# Patient Record
Sex: FEMALE
[De-identification: names, ages, dates, MRNs, and addresses within clinical notes are randomized per-mention and may not be internally consistent; named-entity substitution may affect disease eponyms.]

---

## 2020-04-15 ENCOUNTER — APPOINTMENT (OUTPATIENT)
Dept: ORTHOPEDIC SURGERY | Facility: CLINIC | Age: 30
End: 2020-04-15
Payer: COMMERCIAL

## 2020-04-15 VITALS
HEIGHT: 68 IN | SYSTOLIC BLOOD PRESSURE: 131 MMHG | DIASTOLIC BLOOD PRESSURE: 87 MMHG | WEIGHT: 157 LBS | BODY MASS INDEX: 23.79 KG/M2 | TEMPERATURE: 98.2 F | HEART RATE: 89 BPM

## 2020-04-15 DIAGNOSIS — Z78.9 OTHER SPECIFIED HEALTH STATUS: ICD-10-CM

## 2020-04-15 DIAGNOSIS — Z80.42 FAMILY HISTORY OF MALIGNANT NEOPLASM OF PROSTATE: ICD-10-CM

## 2020-04-15 DIAGNOSIS — Z82.61 FAMILY HISTORY OF ARTHRITIS: ICD-10-CM

## 2020-04-15 PROCEDURE — 73630 X-RAY EXAM OF FOOT: CPT | Mod: LT

## 2020-04-15 PROCEDURE — L3260: CPT

## 2020-04-15 PROCEDURE — 99204 OFFICE O/P NEW MOD 45 MIN: CPT

## 2020-04-15 NOTE — PHYSICAL EXAM
[de-identified] : Constitutional: Well-nourished, well-developed, No acute distress\par Respiratory:  Good respiratory effort, no SOB\par Lymphatic: No regional lymphadenopathy, no lymphedema\par Psychiatric: Pleasant and normal affect, alert and oriented x3\par Skin: Clean dry and intact B/L UE/LE\par Musculoskeletal: normal except where as noted in regional exam\par left Foot:\par APPEARANCE: + swelling, ecchymosis 4th prox phalanx, no malalignment\par POSITIVE TENDERNESS: 4th proximal phalanx, metatarsal\par NONTENDER: 5th metatarsal base, cuboid, 1st MTP, dorsum & plantar surfaces, medial heel, mid heel. \par ROM: normal throughout foot, ankle, and digits. \par RESISTIVE TESTING: painless flex/ext, abd/add of all digits. \par SPECIAL TESTS:  neg Tinel's at tarsal tunnel. \par NEURO: Normal sensation of LE, DTRs 2+/4 patella and achilles\par PULSES: 2+ DP/PT pulses\par \par B/L Knees: No asymmetry, malalignment, or swelling, Full ROM, 5/5 strength in Flex/Ext, Joints stable\par B/L Ankles: No asymmetry, malalignment, or swelling, Full ROM, 5/5 strength in DF/PF/Inv/Ev, Joints stable\par \par \par  [de-identified] : \par The following radiographs were ordered and read by me during this patient's visit. I reviewed each radiograph in detail with the patient and discussed the findings as highlighted below. \par \par 3 views of the left foot were obtained today that show a minimally displaced, partially comminuted oblique fracture at the proximal 4th phalanx

## 2020-04-15 NOTE — DISCUSSION/SUMMARY
[de-identified] : \par Pastora presents with a fourth proximal phalanx fracture.  There is minimal displacement evident on x-ray.  I have advised her to continue buddy taping the toes together.  I have provided her with a postop shoe as well.  Continue ice, elevation, and NSAIDs as needed.  Follow-up in 4 weeks for repeat imaging.\par \par WE SPECIFICALLY DISCUSSED RISK FACTORS FOR COVID-19, INCLUDING AGE>60, HEART OR LUNG DISEASE, DIABETES, IMMUNOSUPPRESSION, AND TRAVEL.  WE ALSO DISCUSSED THAT NSAIDS MAY WORSEN COVID-19 INFECTION SYMPTOMS AND THAT THEY SHOULD NOT BE USED TO TREAT COVID-19 SYMPTOMS.  PATIENT WAS ALSO INFORMED THAT CORTICOSTEROIDS IN ANY FORM (ORAL OR INJECTABLE) WILL DECREASE IMMUNE RESPONSE AND MAY INCREASE RISK OF COVID-19 INFECTION AND SYMPTOMS.\par \par Tabatha Clements MD, EdM\par Sports Medicine PM&R\par \par \par Rosemarie GALVEZ ATC, assisted with the history and documentation for Dr. Clements on this date 04/15/2020\par \[facility]

## 2020-04-15 NOTE — HISTORY OF PRESENT ILLNESS
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  Pain is primarily located at the proximal left 4th toe,   It began in 4/13/2020], after stubbing her toe into a couch. Toe was angulated laterally, she was able to self reduce and tape it to the 3rd toe.   Pain is described as sharp in nature, 3/10 in intensity, worse with weight baring, better with non-weight baring .  \par mild bruising and swelling over the 4th toe and left foot.\par SHe was seen by her primary care doctor and was referred.\par No Prior injury\par Denies bowel/bladder changes, fevers, chills, saddle anesthesia.  Denies numbness, tingling, weakness of the lower extremities.   \par

## 2020-04-15 NOTE — REASON FOR VISIT
[Initial Visit] : an initial visit for [Other: ____] : [unfilled] [Spouse] : spouse [Family Member] : family member

## 2020-05-15 ENCOUNTER — APPOINTMENT (OUTPATIENT)
Dept: ORTHOPEDIC SURGERY | Facility: CLINIC | Age: 30
End: 2020-05-15
Payer: COMMERCIAL

## 2020-05-15 PROCEDURE — 99214 OFFICE O/P EST MOD 30 MIN: CPT

## 2020-05-15 PROCEDURE — 73630 X-RAY EXAM OF FOOT: CPT | Mod: LT

## 2020-05-15 NOTE — ADDENDUM
[FreeTextEntry1] : I, Nancy Zuluaga ATC, assisted with the history and documentation for Dr. Clements on this date 05/15/2020.\par

## 2020-05-15 NOTE — PHYSICAL EXAM
[de-identified] : Constitutional: Well-nourished, well-developed, No acute distress\par Respiratory:  Good respiratory effort, no SOB\par Lymphatic: No regional lymphadenopathy, no lymphedema\par Psychiatric: Pleasant and normal affect, alert and oriented x3\par Skin: Clean dry and intact B/L UE/LE\par Musculoskeletal: normal except where as noted in regional exam\par left Foot:\par APPEARANCE: + swelling, ecchymosis 4th prox phalanx, no malalignment\par POSITIVE TENDERNESS: 4th proximal phalanx, metatarsal\par NONTENDER: 5th metatarsal base, cuboid, 1st MTP, dorsum & plantar surfaces, medial heel, mid heel. \par ROM: normal throughout foot, ankle, and digits. \par RESISTIVE TESTING: painless flex/ext, abd/add of all digits. \par SPECIAL TESTS:  neg Tinel's at tarsal tunnel. \par NEURO: Normal sensation of LE, DTRs 2+/4 patella and achilles\par PULSES: 2+ DP/PT pulses\par \par B/L Knees: No asymmetry, malalignment, or swelling, Full ROM, 5/5 strength in Flex/Ext, Joints stable\par B/L Ankles: No asymmetry, malalignment, or swelling, Full ROM, 5/5 strength in DF/PF/Inv/Ev, Joints stable\par \par \par  [de-identified] : \par The following radiographs were ordered and read by me during this patient's visit. I reviewed each radiograph in detail with the patient and discussed the findings as highlighted below. \par \par 3 views of the left foot were obtained today that show a minimally displaced,  oblique fracture at the proximal 4th phalanx

## 2020-05-15 NOTE — DISCUSSION/SUMMARY
[de-identified] : \par Pastora presents with a fourth proximal phalanx fracture, she is doing well  I have advised her to continue roxana taping the toes together.  She may start wearing supportive shoes if comfortable   Follow-up in 4 weeks for repeat imaging.\par \par Tabatha Clements MD, EdM\par Sports Medicine PM&R\par \par \par \par

## 2020-05-15 NOTE — HISTORY OF PRESENT ILLNESS
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  Pain is primarily located at the proximal left 4th toe,   It began in 4/13/2020, after stubbing her toe into a couch. She was last seen on 4/15 at which time xrays demonstrated a mildly displaced 4th proximal phalanx fracture.  Since that time she has been has been roxana taping the toe and using a post-op shoe. \par

## 2020-05-15 NOTE — REASON FOR VISIT
[Spouse] : spouse [Family Member] : family member [Follow-Up Visit] : a follow-up visit for [Other: ____] : [unfilled]

## 2020-06-15 ENCOUNTER — APPOINTMENT (OUTPATIENT)
Dept: ORTHOPEDIC SURGERY | Facility: CLINIC | Age: 30
End: 2020-06-15
Payer: COMMERCIAL

## 2020-06-15 VITALS
SYSTOLIC BLOOD PRESSURE: 120 MMHG | HEIGHT: 62 IN | DIASTOLIC BLOOD PRESSURE: 72 MMHG | TEMPERATURE: 98.2 F | HEART RATE: 81 BPM | OXYGEN SATURATION: 98 % | WEIGHT: 152 LBS | BODY MASS INDEX: 27.97 KG/M2

## 2020-06-15 DIAGNOSIS — S92.919A UNSPECIFIED FRACTURE OF UNSPECIFIED TOE(S), INITIAL ENCOUNTER FOR CLOSED FRACTURE: ICD-10-CM

## 2020-06-15 PROCEDURE — 73630 X-RAY EXAM OF FOOT: CPT | Mod: LT

## 2020-06-15 PROCEDURE — 99214 OFFICE O/P EST MOD 30 MIN: CPT

## 2020-06-15 NOTE — DISCUSSION/SUMMARY
[de-identified] : Pastora presents for follow up of phalanx fracture.  She is doing well and has no pain.  No significant malalignment on xray.  Cleared for all activity.\par Follow up as needed.\par \par Tabatha Clements MD, EdM\par Sports Medicine PM&R\par Department of Orthopedics\par \par \par IRosemarie ATC, assisted with the history and documentation for Dr. Clements on this date 06/15/2020\par \par

## 2020-06-15 NOTE — HISTORY OF PRESENT ILLNESS
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 Pain was primarily located at the proximal left 4th toe, It began in 4/13/2020, after stubbing her toe into a couch. She reports feeling 100% better, no pain with ambulation, bending, or direct pressure.  She was last seen on 5/15, last visit  xrays demonstrated a mildly displaced 4th proximal phalanx fracture. \par

## 2020-06-15 NOTE — PHYSICAL EXAM
[de-identified] : Constitutional: Well-nourished, well-developed, No acute distress\par Respiratory: Good respiratory effort, no SOB\par Lymphatic: No regional lymphadenopathy, no lymphedema\par Psychiatric: Pleasant and normal affect, alert and oriented x3\par Skin: Clean dry and intact B/L UE/LE\par Musculoskeletal: normal except where as noted in regional exam\par \par left Foot:\par APPEARANCE:no swelling, ecchymosis, no malalignment\par NONTENDER:  4th proximal phalanx, metatarsal, 5th metatarsal base, cuboid, 1st MTP, dorsum & plantar surfaces, medial heel, mid heel. \par ROM: normal throughout foot, ankle, and digits. \par RESISTIVE TESTING: painless flex/ext, abd/add of all digits. \par SPECIAL TESTS: neg Tinel's at tarsal tunnel. \par NEURO: Normal sensation of LE, DTRs 2+/4 patella and achilles\par PULSES: 2+ DP/PT pulses\par \par B/L Knees: No asymmetry, malalignment, or swelling, Full ROM, 5/5 strength in Flex/Ext, Joints stable\par B/L Ankles: No asymmetry, malalignment, or swelling, Full ROM, 5/5 strength in DF/PF/Inv/Ev, Joints stable [de-identified] : \par The following radiographs were ordered and read by me during this patient's visit. I reviewed each radiograph in detail with the patient and discussed the findings as highlighted below. \par \par 3 views of the left foot were obtained today that show a minimally displaced,  oblique fracture at the proximal 4th phalanx with interval callous formation